# Patient Record
(demographics unavailable — no encounter records)

---

## 2024-12-05 NOTE — HISTORY OF PRESENT ILLNESS
[FreeTextEntry1] : 73-year-old male PMH: BPH, prostate CA, orthostatic hypotension, HLD, prediabetes, MAREN  -The patient is being treated for prostate CA.  -He has sinus issues and has been on inhalers.   -Known orthostatic hypotension in the past.   Patient presents for follow up visit. No chest pain, dizziness, syncope, or LE swelling. Occasional HAMILTON. Occasional palpitations has been seen in ER for this in June no arrythmias reported. Patient had R TKR in June, feeling well since then. Increased palpitations with hormonal treatment of his prostate CA .  Getting night sweats from hormonal therapy   11/27/24  HDL 39  Trig 256 A1c 6.0

## 2024-12-05 NOTE — PHYSICAL EXAM
[General Appearance - Well Developed] : well developed [Normal Appearance] : normal appearance [Well Groomed] : well groomed [General Appearance - Well Nourished] : well nourished [No Deformities] : no deformities [General Appearance - In No Acute Distress] : no acute distress [Normal Conjunctiva] : the conjunctiva exhibited no abnormalities [Eyelids - No Xanthelasma] : the eyelids demonstrated no xanthelasmas [Normal Oral Mucosa] : normal oral mucosa [No Oral Pallor] : no oral pallor [No Oral Cyanosis] : no oral cyanosis [Respiration, Rhythm And Depth] : normal respiratory rhythm and effort [Exaggerated Use Of Accessory Muscles For Inspiration] : no accessory muscle use [Auscultation Breath Sounds / Voice Sounds] : lungs were clear to auscultation bilaterally [Abdomen Soft] : soft [Abdomen Tenderness] : non-tender [Abdomen Mass (___ Cm)] : no abdominal mass palpated [Abnormal Walk] : normal gait [Gait - Sufficient For Exercise Testing] : the gait was sufficient for exercise testing [Skin Color & Pigmentation] : normal skin color and pigmentation [] : no rash [No Venous Stasis] : no venous stasis [Skin Lesions] : no skin lesions [No Skin Ulcers] : no skin ulcer [No Xanthoma] : no  xanthoma was observed [Oriented To Time, Place, And Person] : oriented to person, place, and time [Affect] : the affect was normal [Mood] : the mood was normal [No Anxiety] : not feeling anxious [Normal Rate] : normal [Rhythm Regular] : regular [II] : a grade 2 [2+] : left 2+ [No Pitting Edema] : no pitting edema present [FreeTextEntry1] : No JVD  [Lt] : no varicose veins of the left leg

## 2024-12-05 NOTE — ASSESSMENT
[FreeTextEntry1] : The patient has metastatic prostate CA . He is on hormonal therapy and has issue with this . He has not had chest pain . He has not had syncope or dizziness . HE has no chest pain . BP is stable . He is on Midodrine when needed only Nuclear stress test was negative for ischemia last year .  He had TKR without complication .   Plan: Follow up in 6 months if stable.  Blood work Follow up with urology /

## 2024-12-05 NOTE — REASON FOR VISIT
[Follow-Up - Clinic] : a clinic follow-up of [Palpitations] : palpitations [Syncope] : syncope [FreeTextEntry1] : CAD risk factors. MAREN Chronic sinisitits.

## 2024-12-05 NOTE — CARDIOLOGY SUMMARY
[___] : [unfilled] [de-identified] : 10- NSR LAFB  5- NSR LAFB Early repolarization  01/27/2023 NSR, LAFB 3- NSR LAFB  11- NSR LAFB  4-3-2023 NSR LAFB  6-6-2024 NSR LAD  12-5-2024 SR 62bpm, LAD  [de-identified] :  Completed 6 minute 31 seconds no ischemia Trace MR trace TR . 12-4-2023 Lexiscan stress test No ischemia   [de-identified] : 2-7-2024 Normal LV systolic function Trace MR Trace TR .

## 2025-01-24 NOTE — PROCEDURE
[Osteomeatal Pathology] : osteomeatal pathology [Recalcitrant Symptoms] : recalcitrant symptoms  [Anterior rhinoscopy insufficient to account for symptoms] : anterior rhinoscopy insufficient to account for symptoms [Congested] : congested [Normal] : the paranasal sinuses had no abnormalities [Mass ___ cm] : [unfilled]Ucm mass

## 2025-01-24 NOTE — PHYSICAL EXAM
[Normal] : mucosa is normal [Midline] : trachea located in midline position [de-identified] : Tube present in left ear. Tympanogram Type C rigtht ear 1.1 mL

## 2025-01-24 NOTE — HISTORY OF PRESENT ILLNESS
[FreeTextEntry1] : Patient returns today c/o ear pain. He had right ear pain the other night; ear tubes have fallen out.  He denies any ear fullness, pain, or pressure. Right ear will feel a little clogged at times. Denies any otorrhea. Has constant tinnitus. Had a lot of mucus and blood clots in his nose. Took amoxicillin from PCP. Feels like sinuses are always irritated.

## 2025-01-24 NOTE — ASSESSMENT
[FreeTextEntry1] : Right type C tympanogram/. Left tube in place.  patient had a recent infection. Now, sinuses are back to baseline.  Continue saline irrigation and flonase.

## 2025-06-18 NOTE — HISTORY OF PRESENT ILLNESS
[FreeTextEntry1] : 73-year-old male PMH: BPH, prostate CA, orthostatic hypotension, HLD, prediabetes, MAREN  -The patient is being treated for prostate CA.  -He has sinus issues and has been on inhalers.   -Known orthostatic hypotension in the past.   Patient presents for follow up visit. No chest pain, dizziness, syncope, or LE swelling. Occasional HAMILTON. Occasional palpitations has been seen in ER for this in June no arrythmias reported. Patient had R TKR in June, feeling well since then. Increased palpitations with hormonal treatment of his prostate CA .  Getting night sweats from hormonal therapy   6- The patient has had increased palpitations. The hormone treatement had ended last year. Has stimulator for his back pain

## 2025-06-18 NOTE — CARDIOLOGY SUMMARY
[___] : [unfilled] [de-identified] : 10- NSR LAFB  5- NSR LAFB Early repolarization  01/27/2023 NSR, LAFB 6- NSR LAD 3- NSR LAFB  11- NSR LAFB  4-3-2023 NSR LAFB  6-6-2024 NSR LAD  12-5-2024 SR 62bpm, LAD  [de-identified] :  Completed 6 minute 31 seconds no ischemia Trace MR trace TR . 12-4-2023 Lexiscan stress test No ischemia   [de-identified] : 2-7-2024 Normal LV systolic function Trace MR Trace TR .

## 2025-06-18 NOTE — ASSESSMENT
[FreeTextEntry1] : The patient has metastatic prostate CA . He is on hormonal therapy and has issue with this . He has not had chest pain . He has not had syncope or dizziness . HE has no chest pain . BP is stable . He is on Midodrine when needed only Nuclear stress test was negative for ischemia <2 years ago  .  He had TKR without complication last year. LDL is not at goal despite max dose Atorvasstatin. Some of this may be from his hormone therapy. Will add Zetia. Has had palpitaions which sound more like extrasystoles. .  Plan: EPATCH 1 week Add Zetia 10 mg qd  Follow up in 6 months if stable.  Blood work Follow up with urology